# Patient Record
Sex: FEMALE | Race: WHITE | URBAN - METROPOLITAN AREA
[De-identification: names, ages, dates, MRNs, and addresses within clinical notes are randomized per-mention and may not be internally consistent; named-entity substitution may affect disease eponyms.]

---

## 2021-07-29 ENCOUNTER — OFFICE VISIT (OUTPATIENT)
Dept: URBAN - METROPOLITAN AREA CLINIC 51 | Facility: CLINIC | Age: 61
End: 2021-07-29
Payer: COMMERCIAL

## 2021-07-29 DIAGNOSIS — H25.813 COMBINED FORMS OF AGE-RELATED CATARACT, BILATERAL: Primary | ICD-10-CM

## 2021-07-29 DIAGNOSIS — H02.831 DERMATOCHALASIS OF RIGHT UPPER LID: ICD-10-CM

## 2021-07-29 DIAGNOSIS — H43.313 VITREOUS MEMBRANES AND STRANDS, BILATERAL: ICD-10-CM

## 2021-07-29 DIAGNOSIS — D31.32 BENIGN NEOPLASM OF LEFT CHOROID: ICD-10-CM

## 2021-07-29 DIAGNOSIS — H52.4 PRESBYOPIA: ICD-10-CM

## 2021-07-29 DIAGNOSIS — H02.834 DERMATOCHALASIS OF LEFT UPPER LID: ICD-10-CM

## 2021-07-29 DIAGNOSIS — B39.9 HISTOPLASMOSIS, UNSPECIFIED: ICD-10-CM

## 2021-07-29 PROCEDURE — 92004 COMPRE OPH EXAM NEW PT 1/>: CPT | Performed by: OPTOMETRIST

## 2021-07-29 PROCEDURE — 92134 CPTRZ OPH DX IMG PST SGM RTA: CPT | Performed by: OPTOMETRIST

## 2021-07-29 PROCEDURE — 92250 FUNDUS PHOTOGRAPHY W/I&R: CPT | Performed by: OPTOMETRIST

## 2021-07-29 PROCEDURE — 92133 CPTRZD OPH DX IMG PST SGM ON: CPT | Performed by: OPTOMETRIST

## 2021-07-29 ASSESSMENT — INTRAOCULAR PRESSURE
OS: 17
OD: 16

## 2021-07-29 ASSESSMENT — VISUAL ACUITY
OS: 20/20
OD: 20/20

## 2021-07-29 ASSESSMENT — KERATOMETRY
OS: 45.15
OD: 44.68

## 2021-07-29 NOTE — IMPRESSION/PLAN
Impression: Dermatochalasis of right upper lid: H02.831. Plan: Pt is asymptomatic and has no superior visual field restrictions. Observe.

## 2021-07-29 NOTE — IMPRESSION/PLAN
Impression: Histoplasmosis, unspecified: B39.9. *pt raised in [place] *Denies exposure with chickens Plan: OS has punctate histo spots and PPA. NO CNVM Monitor